# Patient Record
Sex: MALE | URBAN - METROPOLITAN AREA
[De-identification: names, ages, dates, MRNs, and addresses within clinical notes are randomized per-mention and may not be internally consistent; named-entity substitution may affect disease eponyms.]

---

## 2022-09-14 ENCOUNTER — OFFICE VISIT (OUTPATIENT)
Dept: URBAN - METROPOLITAN AREA CLINIC 10 | Facility: CLINIC | Age: 32
End: 2022-09-14
Payer: COMMERCIAL

## 2022-09-14 DIAGNOSIS — H04.123 DRY EYE SYNDROME OF BILATERAL LACRIMAL GLANDS: Primary | ICD-10-CM

## 2022-09-14 DIAGNOSIS — H00.14 CHALAZION LEFT UPPER EYELID: ICD-10-CM

## 2022-09-14 PROCEDURE — 99203 OFFICE O/P NEW LOW 30 MIN: CPT | Performed by: OPTOMETRIST

## 2022-09-14 ASSESSMENT — INTRAOCULAR PRESSURE
OS: 16
OD: 15

## 2022-09-14 ASSESSMENT — VISUAL ACUITY
OS: 20/20
OD: 20/20

## 2022-09-14 NOTE — IMPRESSION/PLAN
Impression: Chalazion left upper eyelid: H00.14. Plan: Recommend warm compresses. If no improvement, schedule with Dr. Svetlana Yanez. Prior auth needed.

## 2025-01-15 ENCOUNTER — OFFICE VISIT (OUTPATIENT)
Dept: URBAN - METROPOLITAN AREA CLINIC 10 | Facility: CLINIC | Age: 35
End: 2025-01-15
Payer: COMMERCIAL

## 2025-01-15 PROCEDURE — 99214 OFFICE O/P EST MOD 30 MIN: CPT | Performed by: OPTOMETRIST

## 2025-01-15 RX ORDER — NEOMYCIN SULFATE, POLYMYXIN B SULFATE AND DEXAMETHASONE 1; 3.5; 1 MG/G; MG/G; [USP'U]/G
OINTMENT OPHTHALMIC
Qty: 3.5 | Refills: 1 | Status: ACTIVE
Start: 2025-01-15

## 2025-01-15 ASSESSMENT — KERATOMETRY
OD: 42.13
OS: 41.63

## 2025-01-15 ASSESSMENT — INTRAOCULAR PRESSURE
OD: 17
OS: 17

## 2025-01-15 ASSESSMENT — VISUAL ACUITY
OD: 20/20
OS: 20/20

## 2025-01-16 ENCOUNTER — OFFICE VISIT (OUTPATIENT)
Dept: URBAN - METROPOLITAN AREA CLINIC 44 | Facility: CLINIC | Age: 35
End: 2025-01-16
Payer: COMMERCIAL

## 2025-01-16 DIAGNOSIS — H00.14 CHALAZION LEFT UPPER EYELID: Primary | ICD-10-CM

## 2025-01-16 DIAGNOSIS — H01.00B UNSPECIFIED BLEPHARITIS LEFT EYE, UPPER AND LOWER EYELIDS: ICD-10-CM

## 2025-01-16 DIAGNOSIS — H01.00A UNSPECIFIED BLEPHARITIS RIGHT EYE, UPPER AND LOWER EYELIDS: ICD-10-CM

## 2025-01-16 DIAGNOSIS — B88.0 OTHER ACARIASIS: ICD-10-CM

## 2025-01-16 PROCEDURE — 99202 OFFICE O/P NEW SF 15 MIN: CPT

## 2025-01-16 RX ORDER — LOTILANER OPHTHALMIC SOLUTION 2.5 MG/ML
0.25 % SOLUTION/ DROPS OPHTHALMIC
Qty: 10 | Refills: 0 | Status: INACTIVE
Start: 2025-01-16 | End: 2025-02-26

## 2025-01-20 ENCOUNTER — OFFICE VISIT (OUTPATIENT)
Dept: URBAN - METROPOLITAN AREA CLINIC 10 | Facility: CLINIC | Age: 35
End: 2025-01-20
Payer: COMMERCIAL

## 2025-01-20 DIAGNOSIS — H00.14 CHALAZION LEFT UPPER EYELID: Primary | ICD-10-CM

## 2025-01-20 PROCEDURE — 67800 REMOVE EYELID LESION: CPT

## 2025-01-20 PROCEDURE — 99213 OFFICE O/P EST LOW 20 MIN: CPT
